# Patient Record
Sex: FEMALE | Race: OTHER | NOT HISPANIC OR LATINO | URBAN - METROPOLITAN AREA
[De-identification: names, ages, dates, MRNs, and addresses within clinical notes are randomized per-mention and may not be internally consistent; named-entity substitution may affect disease eponyms.]

---

## 2021-08-13 ENCOUNTER — EMERGENCY (EMERGENCY)
Facility: HOSPITAL | Age: 61
LOS: 1 days | Discharge: HOME | End: 2021-08-13
Attending: EMERGENCY MEDICINE | Admitting: EMERGENCY MEDICINE
Payer: COMMERCIAL

## 2021-08-13 VITALS
RESPIRATION RATE: 18 BRPM | OXYGEN SATURATION: 98 % | DIASTOLIC BLOOD PRESSURE: 106 MMHG | HEART RATE: 81 BPM | TEMPERATURE: 98 F | SYSTOLIC BLOOD PRESSURE: 170 MMHG

## 2021-08-13 VITALS
HEIGHT: 68 IN | TEMPERATURE: 98 F | RESPIRATION RATE: 18 BRPM | DIASTOLIC BLOOD PRESSURE: 85 MMHG | SYSTOLIC BLOOD PRESSURE: 133 MMHG | WEIGHT: 179.9 LBS | HEART RATE: 86 BPM | OXYGEN SATURATION: 99 %

## 2021-08-13 DIAGNOSIS — R10.13 EPIGASTRIC PAIN: ICD-10-CM

## 2021-08-13 DIAGNOSIS — Z85.07 PERSONAL HISTORY OF MALIGNANT NEOPLASM OF PANCREAS: ICD-10-CM

## 2021-08-13 DIAGNOSIS — R10.11 RIGHT UPPER QUADRANT PAIN: ICD-10-CM

## 2021-08-13 DIAGNOSIS — R00.1 BRADYCARDIA, UNSPECIFIED: ICD-10-CM

## 2021-08-13 DIAGNOSIS — Z90.49 ACQUIRED ABSENCE OF OTHER SPECIFIED PARTS OF DIGESTIVE TRACT: ICD-10-CM

## 2021-08-13 DIAGNOSIS — R94.31 ABNORMAL ELECTROCARDIOGRAM [ECG] [EKG]: ICD-10-CM

## 2021-08-13 LAB
ALBUMIN SERPL ELPH-MCNC: 3.8 G/DL — SIGNIFICANT CHANGE UP (ref 3.3–5)
ALP SERPL-CCNC: 295 U/L — HIGH (ref 40–120)
ALT FLD-CCNC: 44 U/L — SIGNIFICANT CHANGE UP (ref 10–45)
ANION GAP SERPL CALC-SCNC: 11 MMOL/L — SIGNIFICANT CHANGE UP (ref 5–17)
AST SERPL-CCNC: 90 U/L — HIGH (ref 10–40)
BASOPHILS # BLD AUTO: 0.03 K/UL — SIGNIFICANT CHANGE UP (ref 0–0.2)
BASOPHILS NFR BLD AUTO: 0.4 % — SIGNIFICANT CHANGE UP (ref 0–2)
BILIRUB SERPL-MCNC: 1 MG/DL — SIGNIFICANT CHANGE UP (ref 0.2–1.2)
BUN SERPL-MCNC: 12 MG/DL — SIGNIFICANT CHANGE UP (ref 7–23)
CALCIUM SERPL-MCNC: 8.8 MG/DL — SIGNIFICANT CHANGE UP (ref 8.4–10.5)
CHLORIDE SERPL-SCNC: 102 MMOL/L — SIGNIFICANT CHANGE UP (ref 96–108)
CO2 SERPL-SCNC: 28 MMOL/L — SIGNIFICANT CHANGE UP (ref 22–31)
CREAT SERPL-MCNC: 0.98 MG/DL — SIGNIFICANT CHANGE UP (ref 0.5–1.3)
EOSINOPHIL # BLD AUTO: 0.01 K/UL — SIGNIFICANT CHANGE UP (ref 0–0.5)
EOSINOPHIL NFR BLD AUTO: 0.1 % — SIGNIFICANT CHANGE UP (ref 0–6)
GLUCOSE SERPL-MCNC: 117 MG/DL — HIGH (ref 70–99)
HCT VFR BLD CALC: 32.2 % — LOW (ref 34.5–45)
HGB BLD-MCNC: 10.9 G/DL — LOW (ref 11.5–15.5)
IMM GRANULOCYTES NFR BLD AUTO: 0.3 % — SIGNIFICANT CHANGE UP (ref 0–1.5)
LACTATE SERPL-SCNC: 1.4 MMOL/L — SIGNIFICANT CHANGE UP (ref 0.5–2)
LIDOCAIN IGE QN: 7 U/L — SIGNIFICANT CHANGE UP (ref 7–60)
LYMPHOCYTES # BLD AUTO: 0.75 K/UL — LOW (ref 1–3.3)
LYMPHOCYTES # BLD AUTO: 11.1 % — LOW (ref 13–44)
MCHC RBC-ENTMCNC: 32.4 PG — SIGNIFICANT CHANGE UP (ref 27–34)
MCHC RBC-ENTMCNC: 33.9 GM/DL — SIGNIFICANT CHANGE UP (ref 32–36)
MCV RBC AUTO: 95.8 FL — SIGNIFICANT CHANGE UP (ref 80–100)
MONOCYTES # BLD AUTO: 0.85 K/UL — SIGNIFICANT CHANGE UP (ref 0–0.9)
MONOCYTES NFR BLD AUTO: 12.6 % — SIGNIFICANT CHANGE UP (ref 2–14)
NEUTROPHILS # BLD AUTO: 5.09 K/UL — SIGNIFICANT CHANGE UP (ref 1.8–7.4)
NEUTROPHILS NFR BLD AUTO: 75.5 % — SIGNIFICANT CHANGE UP (ref 43–77)
NRBC # BLD: 0 /100 WBCS — SIGNIFICANT CHANGE UP (ref 0–0)
PLATELET # BLD AUTO: 165 K/UL — SIGNIFICANT CHANGE UP (ref 150–400)
POTASSIUM SERPL-MCNC: 3.8 MMOL/L — SIGNIFICANT CHANGE UP (ref 3.5–5.3)
POTASSIUM SERPL-SCNC: 3.8 MMOL/L — SIGNIFICANT CHANGE UP (ref 3.5–5.3)
PROT SERPL-MCNC: 7.5 G/DL — SIGNIFICANT CHANGE UP (ref 6–8.3)
RBC # BLD: 3.36 M/UL — LOW (ref 3.8–5.2)
RBC # FLD: 13.1 % — SIGNIFICANT CHANGE UP (ref 10.3–14.5)
SARS-COV-2 RNA SPEC QL NAA+PROBE: SIGNIFICANT CHANGE UP
SODIUM SERPL-SCNC: 141 MMOL/L — SIGNIFICANT CHANGE UP (ref 135–145)
WBC # BLD: 6.75 K/UL — SIGNIFICANT CHANGE UP (ref 3.8–10.5)
WBC # FLD AUTO: 6.75 K/UL — SIGNIFICANT CHANGE UP (ref 3.8–10.5)

## 2021-08-13 PROCEDURE — 96375 TX/PRO/DX INJ NEW DRUG ADDON: CPT

## 2021-08-13 PROCEDURE — 83605 ASSAY OF LACTIC ACID: CPT

## 2021-08-13 PROCEDURE — 76705 ECHO EXAM OF ABDOMEN: CPT

## 2021-08-13 PROCEDURE — 99285 EMERGENCY DEPT VISIT HI MDM: CPT

## 2021-08-13 PROCEDURE — 36415 COLL VENOUS BLD VENIPUNCTURE: CPT

## 2021-08-13 PROCEDURE — 83690 ASSAY OF LIPASE: CPT

## 2021-08-13 PROCEDURE — 96376 TX/PRO/DX INJ SAME DRUG ADON: CPT

## 2021-08-13 PROCEDURE — 74177 CT ABD & PELVIS W/CONTRAST: CPT | Mod: 26,ME

## 2021-08-13 PROCEDURE — 76705 ECHO EXAM OF ABDOMEN: CPT | Mod: 26

## 2021-08-13 PROCEDURE — 80053 COMPREHEN METABOLIC PANEL: CPT

## 2021-08-13 PROCEDURE — 74177 CT ABD & PELVIS W/CONTRAST: CPT | Mod: ME

## 2021-08-13 PROCEDURE — 99284 EMERGENCY DEPT VISIT MOD MDM: CPT | Mod: 25

## 2021-08-13 PROCEDURE — 96374 THER/PROPH/DIAG INJ IV PUSH: CPT | Mod: XU

## 2021-08-13 PROCEDURE — U0005: CPT

## 2021-08-13 PROCEDURE — 93005 ELECTROCARDIOGRAM TRACING: CPT

## 2021-08-13 PROCEDURE — G1004: CPT

## 2021-08-13 PROCEDURE — 93010 ELECTROCARDIOGRAM REPORT: CPT

## 2021-08-13 PROCEDURE — 85025 COMPLETE CBC W/AUTO DIFF WBC: CPT

## 2021-08-13 PROCEDURE — U0003: CPT

## 2021-08-13 RX ORDER — SODIUM CHLORIDE 9 MG/ML
1000 INJECTION INTRAMUSCULAR; INTRAVENOUS; SUBCUTANEOUS ONCE
Refills: 0 | Status: COMPLETED | OUTPATIENT
Start: 2021-08-13 | End: 2021-08-13

## 2021-08-13 RX ORDER — FAMOTIDINE 10 MG/ML
20 INJECTION INTRAVENOUS ONCE
Refills: 0 | Status: COMPLETED | OUTPATIENT
Start: 2021-08-13 | End: 2021-08-13

## 2021-08-13 RX ORDER — SUCRALFATE 1 G
1 TABLET ORAL ONCE
Refills: 0 | Status: COMPLETED | OUTPATIENT
Start: 2021-08-13 | End: 2021-08-13

## 2021-08-13 RX ORDER — MORPHINE SULFATE 50 MG/1
4 CAPSULE, EXTENDED RELEASE ORAL ONCE
Refills: 0 | Status: DISCONTINUED | OUTPATIENT
Start: 2021-08-13 | End: 2021-08-13

## 2021-08-13 RX ORDER — IOHEXOL 300 MG/ML
30 INJECTION, SOLUTION INTRAVENOUS ONCE
Refills: 0 | Status: COMPLETED | OUTPATIENT
Start: 2021-08-13 | End: 2021-08-13

## 2021-08-13 RX ADMIN — FAMOTIDINE 20 MILLIGRAM(S): 10 INJECTION INTRAVENOUS at 20:33

## 2021-08-13 RX ADMIN — SODIUM CHLORIDE 1000 MILLILITER(S): 9 INJECTION INTRAMUSCULAR; INTRAVENOUS; SUBCUTANEOUS at 17:54

## 2021-08-13 RX ADMIN — MORPHINE SULFATE 4 MILLIGRAM(S): 50 CAPSULE, EXTENDED RELEASE ORAL at 17:54

## 2021-08-13 RX ADMIN — MORPHINE SULFATE 4 MILLIGRAM(S): 50 CAPSULE, EXTENDED RELEASE ORAL at 19:55

## 2021-08-13 RX ADMIN — Medication 30 MILLILITER(S): at 20:32

## 2021-08-13 RX ADMIN — IOHEXOL 30 MILLILITER(S): 300 INJECTION, SOLUTION INTRAVENOUS at 18:28

## 2021-08-13 RX ADMIN — Medication 1 GRAM(S): at 20:33

## 2021-08-13 RX ADMIN — MORPHINE SULFATE 4 MILLIGRAM(S): 50 CAPSULE, EXTENDED RELEASE ORAL at 19:44

## 2021-08-13 NOTE — ED ADULT NURSE NOTE - OBJECTIVE STATEMENT
patient alert and oriented x 3 came c/o generalized abdominal pain since yesterday . Denies any nausea , vomiting , dysuria , fever nor chills . History of pancreatitis , had chemo last jan 2021 . Seen and examined by Dr. Randhawa , all labs sent , medicated for pain as ordered ,.

## 2021-08-13 NOTE — ED PROVIDER NOTE - PATIENT PORTAL LINK FT
You can access the FollowMyHealth Patient Portal offered by [facility] by registering at the following website: [email or web address]. By joining [employer]’s FollowMyHealth portal, you will also be able to view your health information using other applications (apps) compatible with our system.

## 2021-08-13 NOTE — ED PROVIDER NOTE - PROGRESS NOTE DETAILS
[place]: Hgb 10.9, alk phos 295, AST 90, other labs grossly wnl. US pending (at US). Will reassess. Possible CT if no etiology and still very symptomatic.

## 2021-08-13 NOTE — ED PROVIDER NOTE - OBJECTIVE STATEMENT
60F PMH pancreatic ca p/w abd pain. States she was dx w/ pancreatic ca 2018 ("not the kind that kills you") was tx w/ chemo (last chemo Jan 2021, but still gets monthly injections of unknown med, no surgeries/radiation). Has epigastric pain since yesterday, radiating to RUQ, constant, unchanged w/ eating. Feels mildly similar to pain that preceded cancer diagnosis. PT attributes pain to eating 4 chocolate donuts the day before.   Has dark brown but no black stools.   Denies fevers, chills, nausea, vomiting, diarrhea, black stool, bloody stool, dysuria, hematuria, urinary frequency, focal weakness/numbness, lightheadedness, back pain, rashes, joint pains, recent travel, recent antibiotic use, sick contacts, SOB, CP, rhinorrhea, nasal congestion, sore throat, cough. No change in sense of taste or smell. Normal PO intake, normal BMs. Able to pass gas.   Onc: Dr. Garsia

## 2021-08-13 NOTE — ED PROVIDER NOTE - PHYSICAL EXAMINATION
abd: BS+, soft, +epigastric and RUQ ttp, no rebound/guarding.   no LE edema, normal equal distal pulses, steady unassisted gait.

## 2021-08-13 NOTE — ED PROVIDER NOTE - CLINICAL SUMMARY MEDICAL DECISION MAKING FREE TEXT BOX
60F PMH pancreatic ca p/w abd pain. States she was dx w/ pancreatic ca 2018 ("not the kind that kills you") was tx w/ chemo (last chemo Jan 2021, but still gets monthly injections of unknown med, no surgeries/radiation). Has epigastric pain since yesterday, radiating to RUQ, constant, unchanged w/ eating. Feels mildly similar to pain that preceded cancer diagnosis. PT attributes pain to eating 4 chocolate donuts the day before. Vitals wnl, exam as above.  ddx: Possible verito vs. pancreatitis vs. GERD/gastritis/PUD vs. colitis.  Labs, US, IVF/symptom control. Possible CT if no etiology depending on reassessment. 60F PMH pancreatic ca p/w abd pain. States she was dx w/ pancreatic ca 2018 ("not the kind that kills you") was tx w/ chemo (last chemo Jan 2021, but still gets monthly injections of unknown med, no surgeries/radiation). Has epigastric pain since yesterday, radiating to RUQ, constant, unchanged w/ eating. Feels mildly similar to pain that preceded cancer diagnosis. PT attributes pain to eating 4 chocolate donuts the day before. Vitals wnl, exam as above.  ddx: Possible verito vs. pancreatitis vs. GERD/gastritis/PUD vs. colitis.  Labs, US, IVF/symptom control. Possible CT if no etiology depending on reassessment.    Patient with persistent pain , sonogram show mets and no signs of cholelithiasis.  Ct was done however patient refuse to stay for results and was uncooperative.  She stated she wanted to go home and see her own doctor. Patient signed AMA and was dc.

## 2021-08-13 NOTE — ED ADULT NURSE NOTE - NSIMPLEMENTINTERV_GEN_ALL_ED
Implemented All Universal Safety Interventions:  [place] to call system. Call bell, personal items and telephone within reach. Instruct patient to call for assistance. Room bathroom lighting operational. Non-slip footwear when patient is off stretcher. Physically safe environment: no spills, clutter or unnecessary equipment. Stretcher in lowest position, wheels locked, appropriate side rails in place.

## 2024-04-26 NOTE — ED PROVIDER NOTE - DURATION
Patient attended Cardiac Rehab Phase 3. Please refer to exercise data found in this note.     04/26/24 0800   Session Information   Phase Phase III   Session Number 31   Pre-Session Evaluation   Is patient on O2? N   Resting Vital Signs   Resting Heart Rate 60   Resting /74   Resting Dyspnea No   SpO2 97 %   Pulse Ox  Mode Intermittent   O2 Device None/Room air   Exercise/Activity   Exercise/Activity Treadmill;Other   Treadmill Exercise Assessment   Treadmill Duration 45   Treadmill Intensity Moderate   Treadmill Adjusted Workload 3.2   Other Exercise Assessment   Other Exercise Type NuStep   Other Exercise Duration 21   Other Exercise Intensity Moderate    Other Adjusted Workload L5   During Exercise   Exercise HR 94   Exercise /70   Exercise Dyspnea No   SpO2 96 %   Recovery Vital Signs   Recovery HR 77   Recovery /62   Recovery Dyspnea No   SpO2 97 %   Pulse Ox  Mode Intermittent   O2 Device None/room air        yesterday

## 2024-08-07 NOTE — ED ADULT NURSE NOTE - PRO INTERPRETER NEED 2
Addended by: HARMONY APODACA on: 8/7/2024 04:50 PM     Modules accepted: Level of Service     English